# Patient Record
Sex: MALE | Race: WHITE | NOT HISPANIC OR LATINO | URBAN - METROPOLITAN AREA
[De-identification: names, ages, dates, MRNs, and addresses within clinical notes are randomized per-mention and may not be internally consistent; named-entity substitution may affect disease eponyms.]

---

## 2024-11-07 ENCOUNTER — APPOINTMENT (OUTPATIENT)
Dept: RADIOLOGY | Facility: CLINIC | Age: 39
End: 2024-11-07
Payer: COMMERCIAL

## 2024-11-07 ENCOUNTER — OFFICE VISIT (OUTPATIENT)
Dept: URGENT CARE | Facility: CLINIC | Age: 39
End: 2024-11-07
Payer: COMMERCIAL

## 2024-11-07 ENCOUNTER — TELEPHONE (OUTPATIENT)
Dept: OTHER | Facility: OTHER | Age: 39
End: 2024-11-07

## 2024-11-07 VITALS
BODY MASS INDEX: 30.8 KG/M2 | HEIGHT: 71 IN | OXYGEN SATURATION: 96 % | RESPIRATION RATE: 18 BRPM | DIASTOLIC BLOOD PRESSURE: 84 MMHG | HEART RATE: 75 BPM | TEMPERATURE: 98 F | WEIGHT: 220 LBS | SYSTOLIC BLOOD PRESSURE: 136 MMHG

## 2024-11-07 DIAGNOSIS — S99.192A CLOSED FRACTURE OF BASE OF FIFTH METATARSAL BONE OF LEFT FOOT AT METAPHYSEAL-DIAPHYSEAL JUNCTION, INITIAL ENCOUNTER: Primary | ICD-10-CM

## 2024-11-07 DIAGNOSIS — S99.922A FOOT INJURY, LEFT, INITIAL ENCOUNTER: ICD-10-CM

## 2024-11-07 DIAGNOSIS — S93.432A SPRAIN OF TIBIOFIBULAR LIGAMENT OF LEFT ANKLE, INITIAL ENCOUNTER: ICD-10-CM

## 2024-11-07 DIAGNOSIS — S99.912A INJURY OF LEFT ANKLE, INITIAL ENCOUNTER: ICD-10-CM

## 2024-11-07 PROCEDURE — 73610 X-RAY EXAM OF ANKLE: CPT

## 2024-11-07 PROCEDURE — 29515 APPLICATION SHORT LEG SPLINT: CPT | Performed by: PHYSICIAN ASSISTANT

## 2024-11-07 PROCEDURE — 99204 OFFICE O/P NEW MOD 45 MIN: CPT | Performed by: PHYSICIAN ASSISTANT

## 2024-11-07 PROCEDURE — 73630 X-RAY EXAM OF FOOT: CPT

## 2024-11-07 RX ORDER — IBUPROFEN 600 MG/1
600 TABLET, FILM COATED ORAL EVERY 6 HOURS PRN
Qty: 30 TABLET | Refills: 0 | Status: SHIPPED | OUTPATIENT
Start: 2024-11-07

## 2024-11-07 NOTE — PROGRESS NOTES
[facility]        NAME: Saurav Grace is a 38 y.o. male  : 1985    MRN: [id number]  DATE: 2024  TIME: 6:29 PM    Assessment and Plan   Closed fracture of base of fifth metatarsal bone of left foot at metaphyseal-diaphyseal junction, initial encounter [S99.192A]  1. Closed fracture of base of fifth metatarsal bone of left foot at metaphyseal-diaphyseal junction, initial encounter  ibuprofen (MOTRIN) 600 mg tablet    Splint application      2. Sprain of tibiofibular ligament of left ankle, initial encounter  XR ankle 3+ vw left    Ambulatory Referral to Podiatry    ibuprofen (MOTRIN) 600 mg tablet    Splint application      3. Foot injury, left, initial encounter  XR foot 3+ vw left    Ambulatory Referral to Podiatry    ibuprofen (MOTRIN) 600 mg tablet    Splint application            Patient Instructions     Preliminary x-rays of your left foot and ankle reveal Camara fracture to the fifth metatarsal  No weightbearing  Crutches provided and educated  Podiatry referral provided    Ibuprofen 600 mg 1 tablet every 6 hours as needed  RICE    Follow up with PCP in 3-5 days.  Proceed to  ER if symptoms worsen.    If tests are performed, our office will contact you with results only if changes need to made to the care plan discussed with you at the visit. [facility]    Chief Complaint     Chief Complaint   Patient presents with    Ankle Injury     Rolled ankle  It felt floppy after.  1 hour ago. Left ankle          History of Present Illness       38-year-old male jumped off a 18 inch platform onto the ground with his left foot inverted.  Pain is throbbing on rest, sharp when wearing weight.  Denies numbness paresthesia or weakness.  Able to walk without difficulty.  Denies prior left ankle or foot injury, trauma or surgery.        Review of Systems   Review of Systems   Constitutional:  Negative for fever.   Gastrointestinal:  Negative for nausea and  "vomiting.   Musculoskeletal:  Positive for joint swelling.   Skin:  Negative for rash.   Neurological:  Negative for headaches.         Current Medications       Current Outpatient Medications:     ibuprofen (MOTRIN) 600 mg tablet, Take 1 tablet (600 mg total) by mouth every 6 (six) hours as needed for moderate pain, Disp: 30 tablet, Rfl: 0    Current Allergies     Allergies as of 11/07/2024    (No Known Allergies)            The following portions of the patient's history were reviewed and updated as appropriate: allergies, current medications, past family history, past medical history, past social history, past surgical history and problem list.     History reviewed. No pertinent past medical history.    Past Surgical History:   Procedure Laterality Date    NO PAST SURGERIES         History reviewed. No pertinent family history.      Medications have been verified.        Objective   /84   Pulse 75   Temp 98 °F (36.7 °C)   Resp 18   Ht 5' 11\" (1.803 m)   Wt 99.8 kg (220 lb)   SpO2 96%   BMI 30.68 kg/m²        Physical Exam     Physical Exam  Vitals and nursing note reviewed.   Constitutional:       General: He is not in acute distress.     Appearance: Normal appearance. He is normal weight.   Eyes:      General: No scleral icterus.     Extraocular Movements: Extraocular movements intact.      Conjunctiva/sclera: Conjunctivae normal.      Pupils: Pupils are equal, round, and reactive to light.   Cardiovascular:      Rate and Rhythm: Normal rate and regular rhythm.      Pulses: Normal pulses.   Pulmonary:      Effort: Pulmonary effort is normal. No respiratory distress.   Musculoskeletal:      Cervical back: Normal range of motion.      Right ankle: Normal.      Right Achilles Tendon: Normal.      Left ankle: Swelling present. No deformity, ecchymosis or lacerations. Tenderness present over the lateral malleolus and base of 5th metatarsal. No medial malleolus, ATF ligament, AITF ligament, CF ligament, " "posterior TF ligament or proximal fibula tenderness. Normal range of motion. Anterior drawer test negative. Normal pulse.      Left Achilles Tendon: Normal.      Comments: Full range of motion of the left ankle in all planes.  Swelling noted over the lateral malleolus.  Ecchymotic lesion noted over the fifth metatarsal with tenderness on palpation.   Skin:     Findings: Bruising present.   Neurological:      Mental Status: He is alert and oriented to person, place, and time.      Coordination: Coordination normal.      Gait: Gait normal.   Psychiatric:         Mood and Affect: Mood normal.         Behavior: Behavior normal.         Thought Content: Thought content normal.         Judgment: Judgment normal.         Splint application    Date/Time: 11/7/2024 5:30 PM    Performed by: Alen Bustos PA-C  Authorized by: Alen Bustos PA-C  Universal Protocol:  Procedure performed by: (Candace JIMENEZ)  Consent: Verbal consent obtained.  Risks and benefits: risks, benefits and alternatives were discussed  Time out: Immediately prior to procedure a \"time out\" was called to verify the correct patient, procedure, equipment, support staff and site/side marked as required.  Timeout called at: 11/7/2024 6:28 PM.  Patient understanding: patient states understanding of the procedure being performed  Patient consent: the patient's understanding of the procedure matches consent given  Procedure consent: procedure consent matches procedure scheduled  Relevant documents: relevant documents present and verified  Test results: test results available and properly labeled  Radiology Images displayed and confirmed. If images not available, report reviewed: imaging studies available  Required items: required blood products, implants, devices, and special equipment available    Procedure details:     Location:  Foot    Foot:  L foot    Strapping: no      Splint type:  Short leg    Supplies:  Ortho-Glass and 2 layer wrap  Post-procedure details: "     Pain:  Improved    Sensation:  Normal    Skin color:  Normal    Patient tolerance of procedure:  Tolerated well, no immediate complications

## 2024-11-07 NOTE — TELEPHONE ENCOUNTER
Pt called to request an appt. Pt was seen at an Urgent Care and was referred to Podiatry. Appt needs to be schedule within 72 hours.    Please advise.

## 2024-11-07 NOTE — PATIENT INSTRUCTIONS
Patient Education    Preliminary x-rays of your left foot and ankle reveal Camara fracture to the fifth metatarsal  No weightbearing  Crutches provided and educated  Podiatry referral provided  Ibuprofen 600 mg 1 tablet every 6 hours as needed  RICE  Follow up with PCP in 3-5 days.  Proceed to  ER if symptoms worsen.  If tests are performed, our office will contact you with results only if changes need to made to the care plan discussed with you at the visit. You can review your full results on [facility].     Stress Fracture of the Metatarsal Bone   About this topic   You have 5 metatarsal bones in the middle of your foot. One leads up to each toe. Stress fractures are common injuries to these bones. Muscles most often cushion these bones and act like shock absorbers. When the muscles are tired or overused, some of the shock gets transferred to the bone. Tiny cracks can happen. Most of the time, these will heal. Sometimes, these tiny cracks can grow larger. They may turn into a full break that goes all the way through the bone. The most common places in the foot to have stress fractures are the second and third metatarsals.  What are the causes?   Stress fractures are often caused by overuse or doing the same movements all the time. They may also be caused by adding to the amount of an activity too quickly. Running, jumping, and playing sports on hard surfaces may also cause stress fractures.  What can make this more likely to happen?   You are more likely to have this problem if you:  Wear old shoes or ones that do not fit properly  Are a long distance runner or exercise for long periods of time  Exercise on concrete or hard surfaces  Are female, especially if you have irregular periods  Have foot problems like flat feet, high arches, bunions, blisters, or tendonitis  Use poor ways to train or have poor flexibility and strength  Weigh too much or too little  Have weak bones  Have had a stress fracture  Do  not take in enough calcium and vitamin D  What are the main signs?   Pain in the foot that is worse with activity and better with rest. Pain slowly gets worse.  Pinpoint tenderness on the bone  Bruising  Swelling  How does the doctor diagnose this health problem?   Your doctor will feel around your foot. The doctor may have you stand or put pressure on the area that is bothering you. The doctor may order:  X-ray  CT or MRI scan  Bone scan  How does the doctor treat this health problem?   Rest  Ice  Keeping the injured foot raised  Inserts for your shoes. These are foot orthotics.  Walking boot or braces  Sometimes, a cast is used on a stress fracture to the fifth metatarsal  Using crutches or walker to take pressure off an injured leg  Exercises  Surgery if the stress fracture does not heal  Are there other health problems to treat?   If there is an underlying problem that is putting you at risk for a stress fracture, that problem should also be treated. These problems may include osteoporosis, diabetes, eating disorders, hormonal imbalances, and poor nutrition or unhealthy exercise routines.  What drugs may be needed?   The doctor may order drugs to:  Help with pain and swelling  What problems could happen?   Trouble healing  Trouble moving the foot  Trouble fitting into shoes  Arthritis  Chronic pain  Foot deformity  What can be done to prevent this health problem?   Warm up slowly and stretch your muscles before you work out. Use good ways to train, such as slowly adding to how far you run. Do not work out if you are overly tired. Take extra care if working out in cold weather.  Always wear proper equipment and footwear when running or playing sports. Replace old shoes often.  Wear shoes with good support. Consider wearing arch supports if you have flat feet.  If you are a runner, change your shoes after 350 to 500 miles.  Avoid walking or running on uneven surfaces or hard surfaces like concrete.  Try swimming,  biking, or walking instead of running to lessen the impact on your feet. If you like to run, try cross training or alternating running with these other activities on different days.  Stay active and work out to keep your muscles strong and flexible.  Keep a healthy weight so there is not extra stress on your joints. Eat a healthy diet to keep your muscles healthy.  Eat a diet rich in calcium and vitamin D to keep your bones strong.  Last Reviewed Date   2020-04-20  Consumer Information Use and Disclaimer   This generalized information is a limited summary of diagnosis, treatment, and/or medication information. It is not meant to be comprehensive and should be used as a tool to help the user understand and/or assess potential diagnostic and treatment options. It does NOT include all information about conditions, treatments, medications, side effects, or risks that may apply to a specific patient. It is not intended to be medical advice or a substitute for the medical advice, diagnosis, or treatment of a health care provider based on the health care provider's examination and assessment of a patient’s specific and unique circumstances. Patients must speak with a health care provider for complete information about their health, medical questions, and treatment options, including any risks or benefits regarding use of medications. This information does not endorse any treatments or medications as safe, effective, or approved for treating a specific patient. [employer] and its affiliates disclaim any warranty or liability relating to this information or the use thereof. The use of this information is governed by the Terms of Use, available at https://www.woltersLIFEMODELERer.com/en/know/clinical-effectiveness-terms   Copyright   Copyright © 2024 [employer] and its affiliates and/or licensors. All rights reserved.